# Patient Record
Sex: MALE | Race: OTHER | HISPANIC OR LATINO | URBAN - METROPOLITAN AREA
[De-identification: names, ages, dates, MRNs, and addresses within clinical notes are randomized per-mention and may not be internally consistent; named-entity substitution may affect disease eponyms.]

---

## 2017-02-20 ENCOUNTER — EMERGENCY (EMERGENCY)
Facility: HOSPITAL | Age: 22
LOS: 1 days | Discharge: HOME | End: 2017-02-20
Attending: EMERGENCY MEDICINE
Payer: MEDICAID

## 2017-02-20 VITALS
TEMPERATURE: 99 F | DIASTOLIC BLOOD PRESSURE: 73 MMHG | SYSTOLIC BLOOD PRESSURE: 126 MMHG | HEART RATE: 79 BPM | OXYGEN SATURATION: 100 % | RESPIRATION RATE: 18 BRPM

## 2017-02-20 VITALS
TEMPERATURE: 99 F | SYSTOLIC BLOOD PRESSURE: 132 MMHG | RESPIRATION RATE: 17 BRPM | HEART RATE: 80 BPM | WEIGHT: 115.08 LBS | OXYGEN SATURATION: 97 % | DIASTOLIC BLOOD PRESSURE: 86 MMHG | HEIGHT: 62 IN

## 2017-02-20 DIAGNOSIS — R10.30 LOWER ABDOMINAL PAIN, UNSPECIFIED: ICD-10-CM

## 2017-02-20 DIAGNOSIS — M54.5 LOW BACK PAIN: ICD-10-CM

## 2017-02-20 LAB
APPEARANCE UR: CLEAR — SIGNIFICANT CHANGE UP
BILIRUB UR-MCNC: NEGATIVE — SIGNIFICANT CHANGE UP
COLOR SPEC: YELLOW — SIGNIFICANT CHANGE UP
DIFF PNL FLD: NEGATIVE — SIGNIFICANT CHANGE UP
GLUCOSE UR QL: NEGATIVE — SIGNIFICANT CHANGE UP
KETONES UR-MCNC: NEGATIVE — SIGNIFICANT CHANGE UP
LEUKOCYTE ESTERASE UR-ACNC: NEGATIVE — SIGNIFICANT CHANGE UP
NITRITE UR-MCNC: NEGATIVE — SIGNIFICANT CHANGE UP
PH UR: 6 — SIGNIFICANT CHANGE UP (ref 4.8–8)
PROT UR-MCNC: NEGATIVE — SIGNIFICANT CHANGE UP
SP GR SPEC: 1.01 — SIGNIFICANT CHANGE UP (ref 1.01–1.02)
UROBILINOGEN FLD QL: NEGATIVE — SIGNIFICANT CHANGE UP

## 2017-02-20 PROCEDURE — 81003 URINALYSIS AUTO W/O SCOPE: CPT

## 2017-02-20 PROCEDURE — 76870 US EXAM SCROTUM: CPT | Mod: 26

## 2017-02-20 PROCEDURE — 99284 EMERGENCY DEPT VISIT MOD MDM: CPT

## 2017-02-20 PROCEDURE — 76870 US EXAM SCROTUM: CPT

## 2017-02-20 PROCEDURE — 87086 URINE CULTURE/COLONY COUNT: CPT

## 2017-02-20 RX ORDER — IBUPROFEN 200 MG
600 TABLET ORAL ONCE
Qty: 0 | Refills: 0 | Status: COMPLETED | OUTPATIENT
Start: 2017-02-20 | End: 2017-02-20

## 2017-02-20 RX ORDER — IBUPROFEN 200 MG
1 TABLET ORAL
Qty: 20 | Refills: 0 | OUTPATIENT
Start: 2017-02-20 | End: 2017-02-25

## 2017-02-20 RX ADMIN — Medication 600 MILLIGRAM(S): at 17:05

## 2017-02-20 NOTE — ED ADULT TRIAGE NOTE - RESPIRATORY RATE (BREATHS/MIN)
[address]  [address] [place] [address] [phone]               Thank you for choosing us for your health care visit with Neris Garcia PA-C.   We are glad to serve you and happy to provide you with this sum · If you are overweight, lose weight to help healing. · Choose supportive shoes with good arch support and shock absorbency. Replace athletic shoes when they become worn out. Don’t walk or run barefoot.   · Premade or custom-fitted shoe inserts may be help © 2700-2246 [address], [address]. All rights reserved. This information is not intended as a substitute for professional medical care. Always follow your healthcare professional's instructions.         Loretta Collins pain and swelling. · Taking pain medicines. Prescription and over-the-counter pain medicines can help relieve pain and swelling. · Using heel cups or foot inserts (orthotics).  These are placed in the shoes to help support the heel or arch and cushion the problem in order to suggest ways to relieve pain. If your pain is due to poor foot mechanics, custom-made shoe inserts (orthoses) may help. Reduce symptoms  · To relieve mild symptoms, try aspirin, ibuprofen, or other medicines as directed.  Rubbing ice Your Appointments     Feb 10, 2017 10:00 PM   Diagnostic Sleep Study with [address] [facility])    [place], Leopoldo Highman  [address] [place] [address] Ave            Mar 10, 2017  2: view more details from this visit by going to [email or web address]. If you've recently had a stay at the Hospital you can access your discharge instructions in [facility] by going to Visits < Admission Summaries.  If you've been to the Emergency Depar 17

## 2017-02-20 NOTE — ED PROVIDER NOTE - OBJECTIVE STATEMENT
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 Worse after working. Left groin pain worse with movement. as well as back pain. No trauma per pt, Denies fevers, chills, nausea, vomiting. Not sexually active, no penile bleeding or discharge. Not having urinary symptoms. No travel. No meds taken prior to arrival. 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 Worse after working. Left groin pain worse with movement. as well as back pain. No trauma per pt, Denies fevers, chills, nausea, vomiting. Not sexually active, no penile bleeding or discharge. Not having urinary symptoms. No travel. No meds taken prior to arrival.  PT cell # [phone]

## 2017-02-20 NOTE — ED PROVIDER NOTE - PHYSICAL EXAMINATION
GENERAL: No acute distress, non toxic  HEAD: Atraumatic, normocephalic  EARS: Externally normal, atraumatic, TMs normal bilaterally  EYES: No jaundice, not injected, no rupture, no foreign bodies  MOUTH: Moist mucous membranes, no open lesion, uvula midline without edema, no exudates, no peritonsilar abscess bilaterally.  NECK: Supple, full range of motion, no swelling, no lymphadenopathy  HEART: Regular rate and rhythm, no murmurs, no rubs, no gallops  LUNGS: Clear to auscultation bilaterally without rhonci, rales, or wheezing  ABDOMEN: Soft and non tender in all 4 quadrants, normal bowel sounds, no signs of trauma, no costovertebral tenderness bilaterally  : Circumsized, no penile discharge or bleeding, no gross abnormalities, tenderness to palpation in left groin. No hernias or masses, cremasteric reflex intact bilaterally, no epididymal pain with palpation.   BACK/SPINE: Non tender spine in cervical/thoracic/lumbar regions, no stepoffs palpable. tenderness to palpation in left low back paraspinal.   EXTREMITIES: No gross deformities  VASCULAR: Pulses palpable in all extremities, no pitting edema, capillary refill <2 secs  SKIN: Grossly intact without rash or open wounds  PSYCH: Alert and oriented x 3  GAIT: Normal without need for assistance

## 2017-02-20 NOTE — ED PROVIDER NOTE - CARE PLAN
Principal Discharge DX:	Groin pain, left  Secondary Diagnosis:	Left-sided low back pain without sciatica, unspecified chronicity

## 2017-02-20 NOTE — ED PROVIDER NOTE - MEDICAL DECISION MAKING DETAILS
20 yo M with no Past Medical History and no surgeries that presents with left groin pain and left low back pain x 1.5 months. Works in construction, pain worse with mvmt. No pain meds taken. Most likely MSK pain, no hernias and no abnormal groin findings, creamasteric reflex intact bilaterally. Will provide pain meds and obtain UA and culture and US of testicles. 22 yo M with no Past Medical History and no surgeries that presents with left groin pain and left low back pain x 1.5 months. Works in construction, pain worse with mvmt. No pain meds taken. Most likely MSK pain, no hernias and no abnormal groin findings, creamasteric reflex intact bilaterally. Will provide pain meds and obtain UA and culture and US of testicles.  UA neg for blood or infection. US normal. Most likely MSK groin strain. Will send home f/u with Outpt clinic/PMD and Rx for pain meds PRN. Return for worsening symptoms. Pt info placed in care connect log

## 2017-02-20 NOTE — ED ADULT NURSE NOTE - OBJECTIVE STATEMENT
Presented to ED complaining of left testicluar pain that started december per patient. Left testicle noted to be lower than the right. No redness observed. Denies trauma and fever.

## 2017-02-21 LAB
C TRACH RRNA SPEC QL NAA+PROBE: SIGNIFICANT CHANGE UP
CULTURE RESULTS: SIGNIFICANT CHANGE UP
N GONORRHOEA RRNA SPEC QL NAA+PROBE: SIGNIFICANT CHANGE UP
SPECIMEN SOURCE: SIGNIFICANT CHANGE UP
SPECIMEN SOURCE: SIGNIFICANT CHANGE UP

## 2018-01-15 ENCOUNTER — EMERGENCY (EMERGENCY)
Facility: HOSPITAL | Age: 23
LOS: 1 days | Discharge: HOME | End: 2018-01-15
Attending: EMERGENCY MEDICINE
Payer: MEDICAID

## 2018-01-15 VITALS
RESPIRATION RATE: 18 BRPM | SYSTOLIC BLOOD PRESSURE: 117 MMHG | DIASTOLIC BLOOD PRESSURE: 69 MMHG | OXYGEN SATURATION: 99 % | HEART RATE: 72 BPM | TEMPERATURE: 98 F

## 2018-01-15 PROCEDURE — 99282 EMERGENCY DEPT VISIT SF MDM: CPT

## 2018-01-15 PROCEDURE — 99283 EMERGENCY DEPT VISIT LOW MDM: CPT

## 2018-01-15 RX ORDER — IBUPROFEN 200 MG
600 TABLET ORAL ONCE
Qty: 0 | Refills: 0 | Status: COMPLETED | OUTPATIENT
Start: 2018-01-15 | End: 2018-01-15

## 2018-01-15 RX ORDER — IBUPROFEN 200 MG
1 TABLET ORAL
Qty: 20 | Refills: 0 | OUTPATIENT
Start: 2018-01-15 | End: 2018-01-19

## 2018-01-15 RX ADMIN — Medication 600 MILLIGRAM(S): at 13:55

## 2018-01-15 RX ADMIN — Medication 600 MILLIGRAM(S): at 14:43

## 2018-01-15 NOTE — ED ADULT TRIAGE NOTE - CHIEF COMPLAINT QUOTE
c/o Rt eye pain states " some chemical went into eye at work " last week . no redness , no tearing , no vision changes

## 2018-01-15 NOTE — ED PROVIDER NOTE - ATTENDING CONTRIBUTION TO CARE
21 y/o M c/o R eye pain x 7 days after accidently splashing battery acid in his R eye last week. Pt self irrigated eye profusely, but has since had persistent R eye pain. Pt denies any blurred vision, discharge, wearing any contact lenses. PE reveals right bulbar conjunctival erythema. Visual acuity 20/20 OD. Coombs lamp (-) for corneal abrasion/ulceration. Pt instructed to f/u with ophthalmologist as an outpt for further eval and management. 23 y/o M c/o R eye pain x 7 days after accidently splashing battery acid in his R eye last week. Pt self irrigated eye profusely, but has since had persistent R eye pain. Pt denies any blurred vision, discharge, wearing any contact lenses. PE reveals right bulbar conjunctival erythema. Visual acuity 20/20 OD. Coombs lamp (-) for corneal abrasion/ulceration. Pt instructed to f/u with ophthalmologist as an outpatient for further eval and management.

## 2018-01-15 NOTE — ED PROVIDER NOTE - MEDICAL DECISION MAKING DETAILS
21 y/o M p/w R eye pain well appearing, vital signs wnl, afebrile no findings on exam of corneal abrasion, will give pain medication and refer to opthalmology for 2-3 days.

## 2018-01-15 NOTE — ED PROVIDER NOTE - CONDUCTED A DETAILED DISCUSSION WITH PATIENT AND/OR GUARDIAN REGARDING, MDM
need for outpatient follow-up radiology results/return to ED if symptoms worsen, persist or questions arise/need for outpatient follow-up

## 2018-01-15 NOTE — ED PROVIDER NOTE - PROGRESS NOTE DETAILS
No corneal abrasion. pH 7. No deficit in visual acuity. Will dc with ophthalmo follow up in 1-2 days. Pt is well appearing walking with steady gait, stable for discharge and follow up without fail with medical doctor. I had a detailed discussion with the patient and/or guardian regarding the historical points, exam findings, and any diagnostic results supporting the discharge diagnosis. Pt educated on care and need for follow up. Strict return instructions and red flag signs and symptoms discussed with patient. Questions answered. Pt shows understanding of discharge information and agrees to follow.

## 2018-01-15 NOTE — ED PROVIDER NOTE - OBJECTIVE STATEMENT
23 y/o M pt w/ no significant PMHx p/w R eye pain x 7 days. Pt reports that he may have spilled battery acid in his R eye last week. Pt washed eye profusely, but has since had persistent R eye pain. Pt denies any blurred vision, discharge, wearing any contact lenses, or any other complaints. NKDA. 21 y/o M pt w/ no significant PMHx p/w R eye pain x 7 days. Pt reports that he may have spilled battery acid in his R eye last week. Pt washed eye profusely, but has since had persistent R eye pain. Pt denies any blurred vision, vision changes, discharge, wearing any contact lenses, or any other complaints. NKDA.

## 2018-01-15 NOTE — ED PROVIDER NOTE - EYE, RIGHT
CONJUNCTIVA ERYTHEMA/pupils equal, round, and reactive to light/no foreign body, no fluorescein uptake

## 2018-01-18 ENCOUNTER — EMERGENCY (EMERGENCY)
Facility: HOSPITAL | Age: 23
LOS: 1 days | Discharge: HOME | End: 2018-01-18
Attending: EMERGENCY MEDICINE
Payer: MEDICAID

## 2018-01-18 VITALS
SYSTOLIC BLOOD PRESSURE: 114 MMHG | TEMPERATURE: 98 F | RESPIRATION RATE: 16 BRPM | OXYGEN SATURATION: 98 % | DIASTOLIC BLOOD PRESSURE: 72 MMHG | HEART RATE: 68 BPM

## 2018-01-18 VITALS
OXYGEN SATURATION: 98 % | DIASTOLIC BLOOD PRESSURE: 69 MMHG | HEART RATE: 67 BPM | SYSTOLIC BLOOD PRESSURE: 121 MMHG | RESPIRATION RATE: 20 BRPM

## 2018-01-18 PROCEDURE — 73140 X-RAY EXAM OF FINGER(S): CPT

## 2018-01-18 PROCEDURE — 73140 X-RAY EXAM OF FINGER(S): CPT | Mod: 26,LT

## 2018-01-18 PROCEDURE — 12001 RPR S/N/AX/GEN/TRNK 2.5CM/<: CPT

## 2018-01-18 PROCEDURE — 99283 EMERGENCY DEPT VISIT LOW MDM: CPT | Mod: 25

## 2018-01-18 PROCEDURE — 12001 RPR S/N/AX/GEN/TRNK 2.5CM/<: CPT | Mod: F6

## 2018-01-18 NOTE — ED PROCEDURE NOTE - PROCEDURE ADDITIONAL DETAILS
Sling applied to finger. Explained to patient that given extensive swelling and devitalized tissue the laceration was approximated as much as possible but there will be a scar.

## 2018-01-18 NOTE — ED PROVIDER NOTE - MEDICAL DECISION MAKING DETAILS
22 year-old male, presents with crush injury/laceration to L index finger. Neurovascularly intact. Plan: xray, lac repair, dc.

## 2018-01-18 NOTE — ED PROVIDER NOTE - ATTENDING CONTRIBUTION TO CARE
22 year-old male, presents with crush injury/laceration to L index finger. Neurovascularly intact. Plan: xray, lac repair, dc.follow up 7 days to remove sutures

## 2018-01-18 NOTE — ED PROVIDER NOTE - PROGRESS NOTE DETAILS
Complex laceration repair: significant swelling and devitalized skin. Area covered with BAcitracin and dressing and splint. Will need to come back in 7 days for suture removal. Pt is well appearing walking with steady gait, stable for discharge and follow up without fail with medical doctor. I had a detailed discussion with the patient and/or guardian regarding the historical points, exam findings, and any diagnostic results supporting the discharge diagnosis. Pt educated on care and need for follow up. Strict return instructions and red flag signs and symptoms discussed with patient. Questions answered. Pt shows understanding of discharge information and agrees to follow.

## 2018-01-18 NOTE — ED PROVIDER NOTE - OBJECTIVE STATEMENT
22 year-old male, no significant PMHx, tetanus up to date, presents with cc laceration. Earlier today while at work, described accidentally crushing finger with a machine and sustained laceration to the index finger of left hand. C/o pain, sharp, moderate, localized, worse with certain movements. Denies numbness, tingling, weakness or any other complaints.

## 2018-01-18 NOTE — ED ADULT NURSE NOTE - OBJECTIVE STATEMENT
pt from home c/o of Lt index finger laceration cut by  today at work pt is alert awake Lt hand 2nd finger lateral laceration no active bleeding with small amt of swelling positive ROM sensation intact

## 2018-01-18 NOTE — ED PROVIDER NOTE - PHYSICAL EXAMINATION
Left index: Limited ROM due to pain,  1.5 cm laceration to dorsal surface of proximal phalanx. Slightly jagged edges, mildly gaping, tender and swollen. No sensory deficits. Able to flex/extend against resistance.

## 2018-01-18 NOTE — ED PROCEDURE NOTE - CPROC ED LACER REPAIR DETAIL1
No foreign body/Non-extensive debridement was performed./Multiple flaps were aligned./The wound was explored to base in bloodless field.

## 2018-01-24 ENCOUNTER — EMERGENCY (EMERGENCY)
Facility: HOSPITAL | Age: 23
LOS: 1 days | Discharge: HOME | End: 2018-01-24
Attending: EMERGENCY MEDICINE
Payer: MEDICAID

## 2018-01-24 VITALS
SYSTOLIC BLOOD PRESSURE: 114 MMHG | TEMPERATURE: 98 F | HEART RATE: 65 BPM | OXYGEN SATURATION: 100 % | DIASTOLIC BLOOD PRESSURE: 70 MMHG | RESPIRATION RATE: 18 BRPM

## 2018-01-24 PROCEDURE — G0463: CPT

## 2018-01-24 NOTE — ED PROVIDER NOTE - MEDICAL DECISION MAKING DETAILS
22 year-old male, presents for suture removal. Denies any complaints. No signs of infection or dehiscence. Plan: suture removal and dc home.

## 2018-01-24 NOTE — ED PROVIDER NOTE - PROGRESS NOTE DETAILS
Sutures removed. No signs of infection or dehiscence. Follow up with PMD as needed. Pt is well appearing walking with steady gait, stable for discharge and follow up without fail with medical doctor. I had a detailed discussion with the patient and/or guardian regarding the historical points, exam findings, and any diagnostic results supporting the discharge diagnosis. Pt educated on care and need for follow up. Strict return instructions and red flag signs and symptoms discussed with patient. Questions answered. Pt shows understanding of discharge information and agrees to follow.

## 2018-01-24 NOTE — ED PROVIDER NOTE - PHYSICAL EXAMINATION
Left index laceration with sutures in place and blood scab. No signs of infection or dehiscence. No sensory deficits. Left index laceration with sutures in place and dried blood scab. No signs of infection or dehiscence. No sensory deficits.

## 2019-01-10 ENCOUNTER — EMERGENCY (EMERGENCY)
Facility: HOSPITAL | Age: 24
LOS: 1 days | Discharge: HOME | End: 2019-01-10
Attending: EMERGENCY MEDICINE
Payer: MEDICAID

## 2019-01-10 VITALS
HEART RATE: 55 BPM | SYSTOLIC BLOOD PRESSURE: 116 MMHG | OXYGEN SATURATION: 97 % | DIASTOLIC BLOOD PRESSURE: 70 MMHG | TEMPERATURE: 98 F | RESPIRATION RATE: 16 BRPM

## 2019-01-10 PROCEDURE — 72100 X-RAY EXAM L-S SPINE 2/3 VWS: CPT

## 2019-01-10 PROCEDURE — 99283 EMERGENCY DEPT VISIT LOW MDM: CPT | Mod: 25

## 2019-01-10 PROCEDURE — 72100 X-RAY EXAM L-S SPINE 2/3 VWS: CPT | Mod: 26

## 2019-01-10 PROCEDURE — 99283 EMERGENCY DEPT VISIT LOW MDM: CPT

## 2019-01-10 RX ORDER — BACLOFEN 100 %
1 POWDER (GRAM) MISCELLANEOUS
Qty: 45 | Refills: 0 | OUTPATIENT
Start: 2019-01-10 | End: 2019-01-24

## 2019-01-10 RX ORDER — BACLOFEN 100 %
20 POWDER (GRAM) MISCELLANEOUS ONCE
Qty: 0 | Refills: 0 | Status: COMPLETED | OUTPATIENT
Start: 2019-01-10 | End: 2019-01-10

## 2019-01-10 RX ADMIN — Medication 20 MILLIGRAM(S): at 12:26

## 2019-01-10 NOTE — ED ADULT NURSE NOTE - NSIMPLEMENTINTERV_GEN_ALL_ED
Implemented All Universal Safety Interventions:  [place] to call system. Call bell, personal items and telephone within reach. Instruct patient to call for assistance. Room bathroom lighting operational. Non-slip footwear when patient is off stretcher. Physically safe environment: no spills, clutter or unnecessary equipment. Stretcher in lowest position, wheels locked, appropriate side rails in place.

## 2019-01-10 NOTE — ED PROVIDER NOTE - MEDICAL DECISION MAKING DETAILS
22 y/o M presents to the ED with chronic lower back pain. Will check xray, give pain control and muscle relaxer, and refer to physical therapy.

## 2019-01-10 NOTE — ED ADULT NURSE NOTE - OBJECTIVE STATEMENT
pt is here for back pain. pt stated that had back pain for 2 years, c/o pain 10/10, denied trama or injury, denied chest pain or sob, no distress noted at this time.

## 2019-01-10 NOTE — ED PROVIDER NOTE - OBJECTIVE STATEMENT
Emirati translation provided by [employer]  [id number]; 22 y/o with no significant PMHx and no significant PSHx presents to the ED with c/o lower back and abd pain and dysuria x 2 years that worsens when he lays down. Pt states he has been seen in the ED for the same sxs x 2 years but nothing was found. Pt denies hx of kidney stones, recent injury or MVC, or any other complaints. NKDA.

## 2019-07-30 NOTE — ED ADULT NURSE NOTE - NS ED NURSE LEVEL OF CONSCIOUSNESS SPEECH
Patient here for EKG after discontinuing Amiodarone. EKG shows SB HR 57. Patient has no cardiac complaints at this time. Plan: Will review with Dr Frias to see if Sotalol needs to be started   Speaking Coherently

## 2020-07-13 NOTE — ED PROVIDER NOTE - SECONDARY DIAGNOSIS.
Call and make follow up in 1 month for review of response to medications/anxiety Left-sided low back pain without sciatica, unspecified chronicity

## 2022-05-16 ENCOUNTER — EMERGENCY (EMERGENCY)
Facility: HOSPITAL | Age: 27
LOS: 1 days | Discharge: HOME | End: 2022-05-16
Attending: EMERGENCY MEDICINE
Payer: MEDICAID

## 2022-05-16 VITALS
HEART RATE: 74 BPM | RESPIRATION RATE: 17 BRPM | OXYGEN SATURATION: 98 % | DIASTOLIC BLOOD PRESSURE: 87 MMHG | HEIGHT: 62 IN | SYSTOLIC BLOOD PRESSURE: 138 MMHG | WEIGHT: 123.46 LBS | TEMPERATURE: 98 F

## 2022-05-16 PROCEDURE — 99284 EMERGENCY DEPT VISIT MOD MDM: CPT | Mod: 25

## 2022-05-16 PROCEDURE — 99284 EMERGENCY DEPT VISIT MOD MDM: CPT

## 2022-05-16 PROCEDURE — 70490 CT SOFT TISSUE NECK W/O DYE: CPT | Mod: 26,MA

## 2022-05-16 PROCEDURE — 70490 CT SOFT TISSUE NECK W/O DYE: CPT | Mod: MA

## 2022-05-16 RX ORDER — LIDOCAINE 4 G/100G
10 CREAM TOPICAL ONCE
Refills: 0 | Status: COMPLETED | OUTPATIENT
Start: 2022-05-16 | End: 2022-05-16

## 2022-05-16 RX ORDER — FAMOTIDINE 10 MG/ML
20 INJECTION INTRAVENOUS ONCE
Refills: 0 | Status: COMPLETED | OUTPATIENT
Start: 2022-05-16 | End: 2022-05-16

## 2022-05-16 RX ADMIN — LIDOCAINE 10 MILLILITER(S): 4 CREAM TOPICAL at 22:28

## 2022-05-16 RX ADMIN — FAMOTIDINE 20 MILLIGRAM(S): 10 INJECTION INTRAVENOUS at 22:28

## 2022-05-16 NOTE — ED ADULT NURSE NOTE - OBJECTIVE STATEMENT
pt is a 27 y/o male  w/ c/o  foreign body  in the throat states that something is  stuck on the throat , pt looks  fairly calm , able to speak in full sentences  no signs of any  shortness of breath pt no drooling  and no choking episode in the ED,

## 2022-05-16 NOTE — ED PROVIDER NOTE - MDM PATIENT STATEMENT FOR ADDL TREATMENT
It is normal,discussed with mom Patient with one or more new problems requiring additional work-up/treatment.

## 2022-05-16 NOTE — ED PROVIDER NOTE - NSFOLLOWUPCLINICS_GEN_ALL_ED_FT
[facility]  ENT  [address], [address]  [place] [address]  Phone: [phone]  Fax:   Follow Up Time: 1-3 Days

## 2022-05-16 NOTE — ED PROVIDER NOTE - PATIENT PORTAL LINK FT
You can access the FollowMyHealth Patient Portal offered by [facility] by registering at the following website: [email or web address]. By joining [facility]’s FollowMyHealth portal, you will also be able to view your health information using other applications (apps) compatible with our system.

## 2022-05-16 NOTE — ED PROVIDER NOTE - CLINICAL SUMMARY MEDICAL DECISION MAKING FREE TEXT BOX
No crepitus on exam. Completely with NAD and swallowing well. CT with no e/o FOB. Patient is well appearing, NAD, afebrile, hemodynamically stable. Any available tests and studies were discussed with patient. Discharged with instructions in further symptomatic care, return precautions, and need for ENT f/u.

## 2022-05-16 NOTE — ED PROVIDER NOTE - NSFOLLOWUPINSTRUCTIONS_ED_ALL_ED_FT
Please follow up with an ENT in 1-2 days.  Please keep well hydrated.  Please return to the emergency department if you have worsening pain, vomiting, fever, or any other symptoms.    Estephanie un seguimiento con un otorrinolaringólogo en 1 o 2 días.  Por favor, manténgase terese hidratado.  Regrese al departamento de emergencias si empeora el dolor, los vómitos, la fiebre o cualquier otro síntoma.      Ingestión de cuerpos extraños en los adultos    Swallowed Foreign Body, Adult     La ingestión de un cuerpo extraño es el acto de tragar algo que se queda atascado. Pueden ser alimentos u otras cosas. El objeto puede quedar atascado en la parte del cuerpo que mueve los alimentos desde la boca hasta el estómago (esófago) o puede atorarse en otra parte del vientre (tubo digestivo). Con frecuencia, el objeto se expulsará del cuerpo por sí solo. Si el médico considera que el objeto es peligroso o que no pasará a través de ascencio cuerpo por sí solo, probablemente deba extraer dicho objeto.    Los objetos pueden ingerirse de manera accidental o deliberada. Es muy importante informar al médico qué es lo que se clayton ingerido. Algunos objetos ingeridos pueden ser muy peligrosos. Ian vez deba recibir tratamiento de emergencia en los siguientes casos:  •Un objeto queda atascado en la garganta.  •No puede tragar.  •Usted no puede respirar terese.  •El objeto es filoso.  •El objeto es nocivo o venenoso (tóxico).    ¿Cuáles son las causas?    La causa más frecuente son los alimentos que no pueden pasar por la parte del cuerpo que mueve los alimentos desde la boca hasta el estómago. Cuando esto ocurre, se denomina impactación alimentaria. Entre los alimentos que pueden causarla, se incluyen los siguientes:  •Carne.  •Verduras duras, denia zanahorias y rábanos.    Otros objetos que se ingieren con frecuencia son:  •Trozos de hueso de la carne o espinas del pescado.  •Palillos de dientes.  •Dentaduras postizas.    ¿Qué incrementa el riesgo?    •Usar dentaduras postizas.  •Kd tomado alcohol o consumido drogas.  •Tener angelica afección de adriano mental.  •Tener problemas para pensar y aprender (deterioro cognitivo).  •Tener angelica savita estrecha o con tejido cicatricial en el vientre.    ¿Cuáles son los signos o los síntomas?  •Dolor u opresión en la garganta o en el pecho.  •Imposibilidad de tragar alimentos o líquidos.  •Imposibilidad de tragar la saliva.  •Babeo.  •Asfixia.  •Voz ronca.  •Dificultad para respirar.  •Respiración ruidosa.  •Vómitos con amelia.    ¿Cómo se trata?    No se necesita tratamiento si el objeto no es peligroso y saldrá (se expulsará) en la materia fecal (heces).    Si el objeto ingerido no es peligroso, tonya está atascado en la parte del cuerpo que mueve los alimentos desde la boca hasta el estómago:  •El médico puede aspirar el objeto suavemente y sacarlo a través de la boca.  •Pueden administrarle un medicamento para relajar los músculos y permitir el paso del objeto hasta el estómago.  •Se puede realizar un procedimiento denominado endoscopia para encontrar y extraer el objeto si no pasa con medicamentos. El médico colocará instrumentos médicos a través de un tubo (endoscopio) para extraer el objeto.    Ian vez deba recibir tratamiento de emergencia en los siguientes casos:  •El objeto está causando que respire (inhale) la saliva hacia dentro de los pulmones (aspiración).  •El objeto ejerce presión sobre las vías respiratorias. Weiser dificulta la respiración.  •El objeto puede dañar el interior del vientre. Algunos de los objetos que pueden causar daño incluyen las pilas, los imanes, los objetos filosos y las drogas.    Siga estas instrucciones en ascencio casa:    Cómo cuidarse usted   •Si se prevé que el objeto que tiene en el vientre se expulse en la materia fecal:  •Coma lo que come normalmente si el médico lo autoriza.  •Siga revisando la materia fecal para lucian si ha eliminado el objeto.  •Llame al médico si no ha eliminado el objeto después de 3 días.  •Si le hicieron un procedimiento para retirar el objeto, siga las indicaciones del médico acerca de cómo cuidarse después del procedimiento.    Instrucciones generales   •[place] los medicamentos de venta renay y los recetados solamente denia se lo haya indicado el médico.  •Concurra a todas las visitas de seguimiento denia se lo haya indicado el médico. Weiser es importante.    ¿Cómo se frances?  •Aleks los alimentos en trozos pequeños.  •Siempre siéntese derecho mientras come.  •Quite los huesos de la carne y las espinas del pescado.  •Mastique terese los alimentos antes de tragarlos.  • No hable, se ría ni camine mientras come o traga.    Comuníquese con un médico si:  •Sigue teniendo problemas después de recibir tratamiento.  •No ha eliminado el objeto después de 3 días.    Solicite ayuda inmediatamente si:  •Tiene fiebre.  •Siente dolor en el pecho o en el abdomen.  •Tose y escupe amelia.  •Tiene amelia en la materia fecal.  •Tiene amelia en el vómito después del tratamiento.    Resumen  •La ingestión de un cuerpo extraño es el acto de tragar algo que se queda atascado.  •El objeto puede quedar atascado en la parte del cuerpo que mueve los alimentos desde la boca hasta el estómago (esófago) o puede atorarse en otra parte del vientre (tubo digestivo).  •Con frecuencia, el objeto se expulsará del cuerpo por sí solo.  •Se puede realizar angelica endoscopia para encontrar y extraer el objeto si no se expulsa de ascencio cuerpo después de que haya tomado medicamentos.  •Llame al médico si no ha eliminado el objeto después de 3 días, si tiene amelia en la materia fecal o si tiene amelia al toser o vomitar.    Esta información no tiene denia fin reemplazar el consejo del médico. Asegúrese de hacerle al médico cualquier pregunta que tenga.

## 2022-05-16 NOTE — ED PROVIDER NOTE - PHYSICAL EXAMINATION
Afebrile, hemodynamically stable, saturating well  NAD, well appearing, sitting comfortably in chair, no WOB, speaking full sentences  No stridor or salivary pooling, nml conversational ability  Head NCAT  EOMI grossly, anicteric  MMM, no oropharyngeal lesions/exudates, no FOB noted  No neck crepitus  Breathing comfortably on RA  AAO, CN's 3-12 grossly intact  CARBONE spontaneously  Skin warm, well perfused, no rashes or hives

## 2022-05-16 NOTE — ED PROVIDER NOTE - OBJECTIVE STATEMENT
[id number]  Patient is a 26 year old male with no significant PHMx presenting to the ED with feeling of foreign body stuck in his throat since yesterday. Patient states he feels difficulty with food going down his throat, both solid and liquid, but it does pass through. Patient otherwise denies all symptoms including fevers, vomiting, and coughing.  [id number].  Patient is a 26 year old male with no significant PHMx presenting to the ED with feeling of foreign body stuck in his throat since yesterday. Onset after having chicken with small bones as well as rice. Patient states he feels difficulty with food going down his throat, both solid and liquid, but it does pass through. Patient otherwise denies all symptoms including fevers, vomiting, and coughing.

## 2022-05-16 NOTE — ED ADULT NURSE NOTE - CAS EDN INTEG ASSESS
We will continue on presently prescribed medications at this time , as symptoms are relatively well-controlled and pt is experiencing no untoward side-effects       - - -

## 2024-02-13 NOTE — ED PROVIDER NOTE - NS ED MD EM SELECTION
Photo Preface (Leave Blank If You Do Not Want): Photographs were obtained today. Detail Level: Simple [id number] Comprehensive

## 2024-07-21 NOTE — ED PROVIDER NOTE - LEFT EYE:     20/
Patient to ed with complaints of a left rib injury which occurred last Sunday when he tripped over a small piece of furniture patient reports pain has continued.   
20

## 2025-01-13 NOTE — ED PROVIDER NOTE - PRINCIPAL DIAGNOSIS
Non responsive with T&R. No void yet this shift. Appears comfortable still. Family at bedside.    Chemical exposure of eye
